# Patient Record
Sex: MALE | Race: WHITE | Employment: FULL TIME | URBAN - METROPOLITAN AREA
[De-identification: names, ages, dates, MRNs, and addresses within clinical notes are randomized per-mention and may not be internally consistent; named-entity substitution may affect disease eponyms.]

---

## 2018-03-26 ENCOUNTER — OFFICE VISIT (OUTPATIENT)
Dept: FAMILY MEDICINE | Facility: CLINIC | Age: 33
End: 2018-03-26

## 2018-03-26 VITALS
HEART RATE: 98 BPM | SYSTOLIC BLOOD PRESSURE: 106 MMHG | OXYGEN SATURATION: 98 % | HEIGHT: 70 IN | TEMPERATURE: 97.8 F | RESPIRATION RATE: 16 BRPM | WEIGHT: 151 LBS | BODY MASS INDEX: 21.62 KG/M2 | DIASTOLIC BLOOD PRESSURE: 82 MMHG

## 2018-03-26 DIAGNOSIS — Z23 NEED FOR PROPHYLACTIC VACCINATION WITH TETANUS-DIPHTHERIA (TD): ICD-10-CM

## 2018-03-26 DIAGNOSIS — F41.9 ANXIETY: Primary | ICD-10-CM

## 2018-03-26 PROCEDURE — 99203 OFFICE O/P NEW LOW 30 MIN: CPT | Performed by: INTERNAL MEDICINE

## 2018-03-26 ASSESSMENT — ANXIETY QUESTIONNAIRES
GAD7 TOTAL SCORE: 12
2. NOT BEING ABLE TO STOP OR CONTROL WORRYING: MORE THAN HALF THE DAYS
3. WORRYING TOO MUCH ABOUT DIFFERENT THINGS: MORE THAN HALF THE DAYS
7. FEELING AFRAID AS IF SOMETHING AWFUL MIGHT HAPPEN: SEVERAL DAYS
6. BECOMING EASILY ANNOYED OR IRRITABLE: MORE THAN HALF THE DAYS
1. FEELING NERVOUS, ANXIOUS, OR ON EDGE: MORE THAN HALF THE DAYS
IF YOU CHECKED OFF ANY PROBLEMS ON THIS QUESTIONNAIRE, HOW DIFFICULT HAVE THESE PROBLEMS MADE IT FOR YOU TO DO YOUR WORK, TAKE CARE OF THINGS AT HOME, OR GET ALONG WITH OTHER PEOPLE: NOT DIFFICULT AT ALL
5. BEING SO RESTLESS THAT IT IS HARD TO SIT STILL: SEVERAL DAYS

## 2018-03-26 ASSESSMENT — PATIENT HEALTH QUESTIONNAIRE - PHQ9: 5. POOR APPETITE OR OVEREATING: MORE THAN HALF THE DAYS

## 2018-03-26 NOTE — PROGRESS NOTES
"  SUBJECTIVE:   Dorian Denis is a 32 year old male who presents to clinic today for the following health issues:    Patient presents to clinic today for anxiety.    He has been noticing more problems dealing with stress through his marriage and becoming more irritable with his young children. These symptoms have been happening on and off over the last two years. More recently, he has wanted to figure out what is going on and talk with a medical professional about it. He has tried the [employer], transcendental meditation, and working out to cope with his stress.     A couple years ago, he had an extramarital affair which put stress on his marriage. He got attached to the woman because his wife and him opened their marriage to another couple. He was  9 years prior to the affair. The woman was a good friend of theirs and is still involved in their lives. His wife and him are in therapy right now and he is confident in his marriage. However, when he is stressed, he starts thinking that his wife will leave him for someone else and says it is his insecurity speaking.     His anxiety does not cause him to avoid social situations. Denies panic attacks and insomnia. He said he once broke down when his wife came home late one night saying, \"You don't love me\" but comments that he was also drunk at the time. Denies heart racing during that episode. Denies problems with anxiety in the past.     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 Does not drink alcohol excessively currently but has done so in the past.       Problem list and histories reviewed & adjusted, as indicated.  Additional history: as documented    There is no problem list on file for this patient.    History reviewed. No pertinent surgical history.    Social History   Substance Use Topics     Smoking status: Never Smoker     Smokeless tobacco: Never Used     Alcohol " "use Yes      Comment: 1-2 drinks a day     Family History   Problem Relation Age of Onset     MENTAL ILLNESS Mother      DIABETES Paternal Grandmother          Current Outpatient Prescriptions   Medication Sig Dispense Refill     Multiple Vitamin (DAILY VITAMIN PO)        Labs reviewed in EPIC    Reviewed and updated as needed this visit by clinical staff  Tobacco  Allergies  Meds  Med Hx  Surg Hx  Fam Hx  Soc Hx      Reviewed and updated as needed this visit by Provider         ROS:  Constitutional, HEENT, cardiovascular, pulmonary, GI, , musculoskeletal, neuro, skin, endocrine and psych systems are negative, except as otherwise noted.    This document serves as a record of the services and decisions personally performed and made by Garth Kumar MD. It was created on his/her behalf by Xiao Lovell, trained medical scribe. The creation of this document is based the provider's statements to the medical scribes.    Nehemias Lovell 4:31 PM, March 26, 2018    OBJECTIVE:     /82  Pulse 98  Temp 97.8  F (36.6  C) (Oral)  Resp 16  Ht 1.765 m (5' 9.5\")  Wt 68.5 kg (151 lb)  SpO2 98%  BMI 21.98 kg/m2  Body mass index is 21.98 kg/(m^2).  GENERAL: healthy, alert and no distress, maintains good eye contact today , answers all questions appropriately, talkative and appropriate   NEURO: Normal strength and tone, mentation intact and speech normal  PSYCH: mentation appears normal, affect normal/bright    Diagnostic Test Results:  Orders Placed This Encounter   Procedures     MENTAL HEALTH REFERRAL  - Adult; Psychiatry and Medication Management; Psychiatry; Other: Not Listed - Enter Referral Details in Scheduling Comments Below; Patient call to schedule         ASSESSMENT/PLAN:   1. Anxiety  This patient has some anxiety which seems frankly proportionate to the degree of his life's markedly stressful issues with a busy full time business of which he is the  , coupled with marital discord " and 2 small children 5 and 8. Patient has no prior history of significant mental health issues and has a lot of insight into his distress. He basically seemed to just want to use my feedback as a  Barometer of what his next steps should be, I asked about panic attacks and or insomnia and / or other serious issues and there aren't any. So I don't think medication is the first step. He needs individual counseling in my opinion, he's already getting joint marital counseling with his spouse. Depending on how things go we could consider adding PAXIL (paroxetine hydrochloride) , suggested telephone call / telephone MD visit in 1-3 months   - MENTAL HEALTH REFERRAL  - Adult; Psychiatry and Medication Management; Psychiatry; Other: Not Listed - Enter Referral Details in Scheduling Comments Below; Patient call to schedule    2. Need for prophylactic vaccination with tetanus-diphtheria (TD)  See [place] Immunization Information Connection (MIIC) , he's already current       Patient Instructions     [facility]    If you have any questions regarding to your visit please contact your care team:     Team Pink:   Clinic Hours Telephone Number   Internal Medicine:  Dr. Lindy Bear, NP       7am-7pm  Monday - Thursday   7am-5pm  Fridays  [phone]  (Appointment scheduling available 24/7)    Questions about your visit?  Team Line  [phone]   Urgent Care - [place] - 11am-9pm Monday-Friday Saturday-Sunday- 9am-5pm   [place] - 5pm-9pm Monday-Friday Saturday-Sunday- 9am-5pm  [phone] - [place]  [phone] - [place]       What options do I have for visits at the clinic other than the traditional office visit?  To expand how we care for you, many of our providers are utilizing electronic visits (e-visits) and telephone visits, when medically appropriate, for interactions with their patients rather than a visit in the clinic.   We also  offer nurse visits for many medical concerns. Just like any other service, we will bill your insurance company for this type of visit based on time spent on the phone with your provider. Not all insurance companies cover these visits. Please check with your medical insurance if this type of visit is covered. You will be responsible for any charges that are not paid by your insurance.      E-visits via Roomishhart:  generally incur a $35.00 fee.  Telephone visits:  Time spent on the phone: *charged based on time that is spent on the phone in increments of 10 minutes. Estimated cost:   5-10 mins $30.00   11-20 mins. $59.00   21-30 mins. $85.00   Use Roomishhart (secure email communication and access to your chart) to send your primary care provider a message or make an appointment. Ask someone on your Team how to sign up for [employer].    For a Price Quote for your services, please call our Consumer Price Line at [phone].    As always, Thank you for trusting us with your health care needs!    Melisa UMANZOR CMA ([facility])      The information in this document, created by the medical scribe, Xiao Lovell, for me, accurately reflects the services I personally performed and the decisions made by me. I have reviewed and approved this document for accuracy prior to leaving the patient care area.    Garth Kumar MD  [facility]

## 2018-03-26 NOTE — MR AVS SNAPSHOT
After Visit Summary   3/26/2018    Dorian Denis    MRN: [id number]           Patient Information     Date Of Birth          1985        Visit Information        Provider Department      3/26/2018 4:10 PM Garth Kumar MD [facility]        Today's Diagnoses     Anxiety    -  1    Need for prophylactic vaccination with tetanus-diphtheria (TD)          Care Instructions    [facility]    If you have any questions regarding to your visit please contact your care team:     Team Pink:   Clinic Hours Telephone Number   Internal Medicine:  Dr. Lindy Bear NP       7am-7pm  Monday - Thursday   7am-5pm  Fridays  [phone]  (Appointment scheduling available 24/7)    Questions about your visit?  Team Line  [phone]   Urgent Care - [place] - 11am-9pm Monday-Friday Saturday-Sunday- 9am-5pm   [place] - 5pm-9pm Monday-Friday Saturday-Sunday- 9am-5pm  [phone] - [place]   [phone] - [place]       What options do I have for visits at the clinic other than the traditional office visit?  To expand how we care for you, many of our providers are utilizing electronic visits (e-visits) and telephone visits, when medically appropriate, for interactions with their patients rather than a visit in the clinic.   We also offer nurse visits for many medical concerns. Just like any other service, we will bill your insurance company for this type of visit based on time spent on the phone with your provider. Not all insurance companies cover these visits. Please check with your medical insurance if this type of visit is covered. You will be responsible for any charges that are not paid by your insurance.      E-visits via [employer]:  generally incur a $35.00 fee.  Telephone visits:  Time spent on the phone: *charged based on time that is spent on the phone in increments of 10 minutes. Estimated cost:   5-10 mins  $30.00   11-20 mins. $59.00   21-30 mins. $85.00   Use Pricing Enginehart (secure email communication and access to your chart) to send your primary care provider a message or make an appointment. Ask someone on your Team how to sign up for [employer].    For a Price Quote for your services, please call our Spinal Modulation Line at [phone].    As always, Thank you for trusting us with your health care needs!    Melisa UMANZOR CMA ([facility])            Follow-ups after your visit        Additional Services     MENTAL HEALTH REFERRAL  - Adult; Psychiatry and Medication Management; Psychiatry; Other: Not Listed - Enter Referral Details in Scheduling Comments Below; Patient call to schedule       All scheduling is subject to the client's specific insurance plan & benefits, provider/location availability, and provider clinical specialities.  Please arrive 15 minutes early for your first appointment and bring your completed paperwork.    Please be aware that coverage of these services is subject to the terms and limitations of your health insurance plan.  Call member services at your health plan with any benefit or coverage questions.    Recommended [facility]                  Who to contact     If you have questions or need follow up information about today's clinic visit or your schedule please contact [facility] directly at [phone].  Normal or non-critical lab and imaging results will be communicated to you by Pricing Enginehart, letter or phone within 4 business days after the clinic has received the results. If you do not hear from us within 7 days, please contact the clinic through P[employer] or phone. If you have a critical or abnormal lab result, we will notify you by phone as soon as possible.  Submit refill requests through [employer] or call your pharmacy and they will forward the refill request to us. Please allow 3 business days for your refill to be completed.          Additional Information  "About Your Visit        [email or web address] Information     [employer]oQuest lets you send messages to your doctor, view your test results, renew your prescriptions, schedule appointments and more. To sign up, go to [email or web address] . Click on \"Log in\" on the left side of the screen, which will take you to the Welcome page. Then click on \"Sign up Now\" on the right side of the page.     You will be asked to enter the access code listed below, as well as some personal information. Please follow the directions to create your username and password.     Your access code is: [email or web address]  Expires: 2018  4:46 PM     Your access code will  in 90 days. If you need help or a new code, please call your [place] clinic or [phone].        Care EveryWhere ID     This is your Care EveryWhere ID. This could be used by other organizations to access your [place] medical records  [id number]        Your Vitals Were     Pulse Temperature Respirations Height Pulse Oximetry BMI (Body Mass Index)    98 97.8  F (36.6  C) (Oral) 16 5' 9.5\" (1.765 m) 98% 21.98 kg/m2       Blood Pressure from Last 3 Encounters:   18 106/82    Weight from Last 3 Encounters:   18 151 lb (68.5 kg)              We Performed the Following     MENTAL HEALTH REFERRAL  - Adult; Psychiatry and Medication Management; Psychiatry; Other: Not Listed - Enter Referral Details in Scheduling Comments Below; Patient call to schedule        Primary Care Provider Office Phone # Fax #    Indra Rush [phone] [address] [facility] [address]        Equal Access to Services     WILLIAMS NOVAK : Haderica Jacques, sierra ball, sumit izaguirre. So [facility] [phone].    ATENCIÓN: Si habla español, tiene a stewart disposición servicios gratuitos de asistencia lingüística. Nahum al [phone].    We comply with applicable federal civil rights laws and [place] laws. We " do not discriminate on the basis of race, color, national origin, age, disability, sex, sexual orientation, or gender identity.            Thank you!     Thank you for choosing [facility]  for your care. Our goal is always to provide you with excellent care. Hearing back from our patients is one way we can continue to improve our services. Please take a few minutes to complete the written survey that you may receive in the mail after your visit with us. Thank you!             Your Updated Medication List - Protect others around you: Learn how to safely use, store and throw away your medicines at www.disposemymeds.org.          This list is accurate as of 3/26/18  4:46 PM.  Always use your most recent med list.                   Brand Name Dispense Instructions for use Diagnosis    DAILY VITAMIN PO

## 2018-03-26 NOTE — PATIENT INSTRUCTIONS
[facility]    If you have any questions regarding to your visit please contact your care team:     Team Pink:   Clinic Hours Telephone Number   Internal Medicine:  Dr. Lindy Bear NP       7am-7pm  Monday - Thursday   7am-5pm  Fridays  [phone]  (Appointment scheduling available 24/7)    Questions about your visit?  Team Line  [phone]   Urgent Care - [place] and [facility] [place] - 11am-9pm Monday-Friday Saturday-Sunday- 9am-5pm   [place] - 5pm-9pm Monday-Friday Saturday-Sunday- 9am-5pm  [phone] - [place]   [phone] - [place]       What options do I have for visits at the clinic other than the traditional office visit?  To expand how we care for you, many of our providers are utilizing electronic visits (e-visits) and telephone visits, when medically appropriate, for interactions with their patients rather than a visit in the clinic.   We also offer nurse visits for many medical concerns. Just like any other service, we will bill your insurance company for this type of visit based on time spent on the phone with your provider. Not all insurance companies cover these visits. Please check with your medical insurance if this type of visit is covered. You will be responsible for any charges that are not paid by your insurance.      E-visits via [employer]:  generally incur a $35.00 fee.  Telephone visits:  Time spent on the phone: *charged based on time that is spent on the phone in increments of 10 minutes. Estimated cost:   5-10 mins $30.00   11-20 mins. $59.00   21-30 mins. $85.00   Use Res[employer]inct (secure email communication and access to your chart) to send your primary care provider a message or make an appointment. Ask someone on your Team how to sign up for [employer].    For a Price Quote for your services, please call our Consumer Price Line at [phone].    As always, Thank you for trusting us with your health care  needs!    Melisa UMANZOR CMA ([facility])

## 2018-03-27 ASSESSMENT — PATIENT HEALTH QUESTIONNAIRE - PHQ9: SUM OF ALL RESPONSES TO PHQ QUESTIONS 1-9: 3

## 2018-03-27 ASSESSMENT — ANXIETY QUESTIONNAIRES: GAD7 TOTAL SCORE: 12

## 2018-03-30 ENCOUNTER — TELEPHONE (OUTPATIENT)
Dept: INTERNAL MEDICINE | Facility: CLINIC | Age: 33
End: 2018-03-30

## 2018-03-30 NOTE — TELEPHONE ENCOUNTER
Garth Kumar MD  [facility] Team Pink                   Can you turn this into a telephone message for the chart ? Thank you !     Garth Kumar MD              Previous Messages       ----- Message -----      From: Jolene Murphy      Sent: 3/30/2018   8:34 AM        To: Garth Kumar MD   Subject: MENTAL HEALTH ORDER                               [facility] contacted and spoke to the patient, and the patient declined services at the time of the call.       Jolene Murphy   , [facility].

## 2018-07-06 ENCOUNTER — OFFICE VISIT (OUTPATIENT)
Dept: INTERNAL MEDICINE | Facility: CLINIC | Age: 33
End: 2018-07-06

## 2018-07-06 VITALS
DIASTOLIC BLOOD PRESSURE: 75 MMHG | WEIGHT: 143 LBS | SYSTOLIC BLOOD PRESSURE: 118 MMHG | RESPIRATION RATE: 16 BRPM | TEMPERATURE: 97.9 F | HEIGHT: 70 IN | BODY MASS INDEX: 20.47 KG/M2 | HEART RATE: 90 BPM

## 2018-07-06 DIAGNOSIS — N52.8 OTHER MALE ERECTILE DYSFUNCTION: Primary | ICD-10-CM

## 2018-07-06 PROCEDURE — 99214 OFFICE O/P EST MOD 30 MIN: CPT | Performed by: INTERNAL MEDICINE

## 2018-07-06 NOTE — MR AVS SNAPSHOT
"              After Visit Summary   2018    Dorian Denis    MRN: [id number]           Patient Information     Date Of Birth          1985        Visit Information        Provider Department      2018 3:30 PM Glen Gonzalez MD [facility]        Today's Diagnoses     Other male erectile dysfunction    -  1      Care Instructions       [facility] for [facility]D [facility]          Follow-ups after your visit        Follow-up notes from your care team     Return if symptoms worsen or fail to improve.      Who to contact     Normal or non-critical lab and imaging results will be communicated to you by [employer] Apparelhart, letter or phone within 4 business days after the clinic has received the results. If you do not hear from us within 7 days, please contact the clinic through [employer] or phone. If you have a critical or abnormal lab result, we will notify you by phone as soon as possible.  Submit refill requests through [employer] or call your pharmacy and they will forward the refill request to us. Please allow 3 business days for your refill to be completed.          If you need to speak with a  for additional information , please call: [phone]             Additional Information About Your Visit        [employer] Information     [employer] lets you send messages to your doctor, view your test results, renew your prescriptions, schedule appointments and more. To sign up, go to [email or web address] . Click on \"Log in\" on the left side of the screen, which will take you to the Welcome page. Then click on \"Sign up Now\" on the right side of the page.     You will be asked to enter the access code listed below, as well as some personal information. Please follow the directions to create your username and password.     Your access code is: [email or web address]  Expires: 10/4/2018  4:14 PM     Your access code will  in 90 days. If you need help or a new code, please call your [place] clinic or " "[phone].        Care EveryWhere ID     This is your Care EveryWhere ID. This could be used by other organizations to access your [place] medical records  [id number]        Your Vitals Were     Pulse Temperature Respirations Height BMI (Body Mass Index)       90 97.9  F (36.6  C) (Tympanic) 16 5' 9.5\" (1.765 m) 20.81 kg/m2        Blood Pressure from Last 3 Encounters:   07/06/18 118/75   03/26/18 106/82    Weight from Last 3 Encounters:   07/06/18 143 lb (64.9 kg)   03/26/18 151 lb (68.5 kg)              Today, you had the following     No orders found for display       Primary Care Provider Office Phone # Fax #    Indra Rush [phone] [address] [facility] [address]        Equal Access to Services     [facility]IA [facility] Hospital[facility] : Hadii bebo ku hadasho Soomaali, waaxda luqadaha, qaybta kaalmada adeegyada, sumit morton haykaylenn vanessa wynne . So [facility] [phone].    ATENCIÓN: Si habla español, tiene a stewart disposición servicios gratuitos de asistencia lingüística. Llame al [phone].    We comply with applicable federal civil rights laws and [place] laws. We do not discriminate on the basis of race, color, national origin, age, disability, sex, sexual orientation, or gender identity.            Thank you!     Thank you for choosing [facility]  for your care. Our goal is always to provide you with excellent care. Hearing back from our patients is one way we can continue to improve our services. Please take a few minutes to complete the written survey that you may receive in the mail after your visit with us. Thank you!             Your Updated Medication List - Protect others around you: Learn how to safely use, store and throw away your medicines at www.disposemymeds.org.          This list is accurate as of 7/6/18  4:18 PM.  Always use your most recent med list.                   Brand Name Dispense Instructions for use Diagnosis    DAILY VITAMIN PO             "

## 2018-07-06 NOTE — PROGRESS NOTES
"  SUBJECTIVE:   Dorian Denis is a 33 year old male who presents to clinic today for the following health issues:      ED      Duration: 1 month    Description (location/character/radiation): problems the last 7 out of 12 attempts    Intensity:  moderate    Accompanying signs and symptoms: possible mental blocks    History (similar episodes/previous evaluation): recently opened up marriage    Precipitating or alleviating factors: None    Therapies tried and outcome: counseling     1. Other male erectile dysfunction      Did take a Viagra from one of his partners and reports it worked great.    PMH: Updated and/or reviewed in chart.    PSH: Updated and/or reviewed in chart.    ROS:  Constitutional, neuro, pulmonary, cardiac, gastrointestinal, genitourinary, musculoskeletal, integument and psychiatric systems are otherwise negative.    OBJECTIVE:                                                    /75  Pulse 90  Temp 97.9  F (36.6  C) (Tympanic)  Resp 16  Ht 5' 9.5\" (1.765 m)  Wt 143 lb (64.9 kg)  BMI 20.81 kg/m2    GEN: No acute distress  EYES: No conjunctival injection or icterus, EOMI grossly intact  RESP: Unlabored, regular  NEURO: Normal gait, MAEx4, light touch sensation grossly intact  PSYCH: Normal mood and affect     ASSESSMENT/PLAN:                                                    1. Other male erectile dysfunction  We discussed risks, benefits, and alternatives of phosphodiesterase 5 inhibitor treatment.  Discussed psychogenic erectile dysfunction and the goal of avoiding psychological dependency on phosphodiesterase 5 inhibitor rather than focus of psychosocial treatment.  Patient repeated attempted negotiation as bridge therapy, but I refused to supply; given his situation, risks outweigh benefits.  I recommended STD screening which was declined as well due to financial concerns.      Patient Instructions      [facility] for [facility]D [facility]        I spent a total of 25 minutes with the " patient of which greater than 50% were devoted to counseling and coordination of care: erectile dysfunction, STD screening.          Glen Gonzalez MD

## 2018-07-27 ENCOUNTER — TELEPHONE (OUTPATIENT)
Dept: SURGERY | Facility: CLINIC | Age: 33
End: 2018-07-27

## 2018-07-27 NOTE — TELEPHONE ENCOUNTER
[facility] Call Center    Phone MessageJake    May a detailed message be left on voicemail: yes    Reason for Call: Jordon called to schedule a Scrotal Lift.  Per the Plastics Clinic: Scheduled with Dr. Tubbs for a 45 min consult on 8/3/18.  Advised if Dr. Tubbs does not perform this procedure that the clinic would reach out.  Thank you.      Action Taken: Message routed to:  Adult Clinics: Surgery, Plastic p [id number]

## 2018-07-27 NOTE — TELEPHONE ENCOUNTER
Dr. Tubbs ok'd having scrotal lifts to his protocol.    Closing encounter.    Bertha Matamoros LPN

## 2018-08-01 ENCOUNTER — TELEPHONE (OUTPATIENT)
Dept: SURGERY | Facility: CLINIC | Age: 33
End: 2018-08-01

## 2018-08-01 NOTE — TELEPHONE ENCOUNTER
PREVISIT INFORMATION                                                    Dorian Denis scheduled for future visit at [facility] specialty clinics.    Patient is scheduled to see Dr. Tubbs  on 8/3/18  Reason for visit: scrotal lift  Referring provider   Has patient seen previous specialist?   Medical Records:  none    REVIEW                                                      New patient packet mailed to patient: N/A  Medication reconciliation complete: N/A      Current Outpatient Prescriptions   Medication Sig Dispense Refill     Multiple Vitamin (DAILY VITAMIN PO)          Allergies: Review of patient's allergies indicates no known allergies.        PLAN/FOLLOW-UP NEEDED                                                      Previsit review complete.  Patient will see provider at future scheduled appointment.     Patient Reminders Given:  Please, make sure you bring an updated list of your medications.   If you are having a procedure, please, present 15 minutes early.  If you need to cancel or reschedule,please call [phone].    Elvi Cannon RN